# Patient Record
Sex: FEMALE | Race: WHITE
[De-identification: names, ages, dates, MRNs, and addresses within clinical notes are randomized per-mention and may not be internally consistent; named-entity substitution may affect disease eponyms.]

---

## 2018-08-17 ENCOUNTER — HOSPITAL ENCOUNTER (EMERGENCY)
Dept: HOSPITAL 45 - C.EDB | Age: 28
Discharge: HOME | End: 2018-08-17
Payer: COMMERCIAL

## 2018-08-17 VITALS
HEIGHT: 69.02 IN | BODY MASS INDEX: 22.01 KG/M2 | BODY MASS INDEX: 22.01 KG/M2 | WEIGHT: 148.59 LBS | WEIGHT: 148.59 LBS | HEIGHT: 69.02 IN

## 2018-08-17 VITALS — DIASTOLIC BLOOD PRESSURE: 65 MMHG | SYSTOLIC BLOOD PRESSURE: 120 MMHG | HEART RATE: 81 BPM | OXYGEN SATURATION: 99 %

## 2018-08-17 VITALS — TEMPERATURE: 97.7 F

## 2018-08-17 DIAGNOSIS — Z79.899: ICD-10-CM
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
## 2018-08-17 DIAGNOSIS — Z91.048: ICD-10-CM

## 2018-08-17 DIAGNOSIS — T78.40XA: Primary | ICD-10-CM

## 2018-08-17 LAB
ALBUMIN SERPL-MCNC: 3.9 GM/DL (ref 3.4–5)
ALP SERPL-CCNC: 56 U/L (ref 45–117)
ALT SERPL-CCNC: 21 U/L (ref 12–78)
AST SERPL-CCNC: 19 U/L (ref 15–37)
BASOPHILS # BLD: 0.01 K/UL (ref 0–0.2)
BASOPHILS NFR BLD: 0.2 %
BUN SERPL-MCNC: 12 MG/DL (ref 7–18)
CALCIUM SERPL-MCNC: 8.8 MG/DL (ref 8.5–10.1)
CO2 SERPL-SCNC: 23 MMOL/L (ref 21–32)
CREAT SERPL-MCNC: 0.82 MG/DL (ref 0.6–1.2)
EOS ABS #: 0.04 K/UL (ref 0–0.5)
EOSINOPHIL NFR BLD AUTO: 211 K/UL (ref 130–400)
GLUCOSE SERPL-MCNC: 117 MG/DL (ref 70–99)
HCT VFR BLD CALC: 38.8 % (ref 37–47)
HGB BLD-MCNC: 13 G/DL (ref 12–16)
IG#: 0 K/UL (ref 0–0.02)
IMM GRANULOCYTES NFR BLD AUTO: 38.4 %
LIPASE: 190 U/L (ref 73–393)
LYMPHOCYTES # BLD: 1.68 K/UL (ref 1.2–3.4)
MCH RBC QN AUTO: 30.4 PG (ref 25–34)
MCHC RBC AUTO-ENTMCNC: 33.5 G/DL (ref 32–36)
MCV RBC AUTO: 90.7 FL (ref 80–100)
MONO ABS #: 0.31 K/UL (ref 0.11–0.59)
MONOCYTES NFR BLD: 7.1 %
NEUT ABS #: 2.34 K/UL (ref 1.4–6.5)
NEUTROPHILS # BLD AUTO: 0.9 %
NEUTROPHILS NFR BLD AUTO: 53.4 %
PMV BLD AUTO: 11.1 FL (ref 7.4–10.4)
POTASSIUM SERPL-SCNC: 3.6 MMOL/L (ref 3.5–5.1)
PROT SERPL-MCNC: 7.6 GM/DL (ref 6.4–8.2)
RED CELL DISTRIBUTION WIDTH CV: 12.8 % (ref 11.5–14.5)
RED CELL DISTRIBUTION WIDTH SD: 42.2 FL (ref 36.4–46.3)
SODIUM SERPL-SCNC: 138 MMOL/L (ref 136–145)
WBC # BLD AUTO: 4.38 K/UL (ref 4.8–10.8)

## 2018-08-17 NOTE — EMERGENCY ROOM VISIT NOTE
History


Report prepared by Madhuri:  Yana Mares


Under the Supervision of:  Dr. Blake Salcedo M.D.


First contact with patient:  13:31


Chief Complaint:  ALLERGIC REACTION


Stated Complaint:  ALLERGIC REACTION





History of Present Illness


The patient is a 28 year old female who presents to the Emergency Room with 

complaints of an allergic reaction beginning around 30 minutes pta. She reports 

she has exercise induced anaphylaxis and states she was walking very quickly 

around campus when her episode began. She has a rash and abdominal pain but 

denies any nausea. The patient did not take her EpiPen and her last episode was 

2 months ago which resolved after she took her EpiPen. She follows with her 

CHAD Ayala.





   Source of History:  patient


   Onset:  around 30 minutes pta


   Position:  head, abdomen, other (upper and lower extremities)


   Quality:  other (allergic reaction)


   Timing:  other (after walking very quickly around campus)


   Associated Symptoms:  + abdominal pain, + rash, No nausea





Review of Systems


See HPI for pertinent positives and negatives.  A total of ten systems were 

reviewed and were otherwise negative.





Past Medical & Surgical


Medical Problems:


(1) Exercise induced anaphylaxis








Social History


Smoking Status:  Never Smoker


Marital Status:  


Housing Status:  lives with significant other





Current/Historical Medications


Scheduled


Cetirizine (Zyrtec), 10 MG PO DAILY





Allergies


Coded Allergies:  


     Dust (Verified  Allergy, Unknown, congestion, 8/17/18)


Uncoded Allergies:  


     DOGS (Allergy, Unknown, congestion, 3/14/14)


     TREE POLLEN (Allergy, Unknown, congestion, 3/14/14)





Physical Exam


Vital Signs











  Date Time  Temp Pulse Resp B/P (MAP) Pulse Ox O2 Delivery O2 Flow Rate FiO2


 


8/17/18 15:00  90 18 104/66 100 Room Air  


 


8/17/18 14:03  96      


 


8/17/18 13:50     100 Room Air  


 


8/17/18 13:30 36.5 80 20 120/74 100 Room Air  











Physical Exam


GENERAL: Awake, alert, well-appearing, in minimal distress


HENT: Normocephalic, atraumatic. Oropharynx without swelling or significant 

edema. 


EYES: Normal conjunctiva. Sclera non-icteric.


NECK: Supple. No nuchal rigidity. 


RESPIRATORY: Clear to auscultation. No wheezes. Normal respiratory effort.


CARDIAC: Normal rate.  Normal rhythm. Extremities warm and well perfused.


GI: Soft, non-distended. Very mild diffuse tenderness. No rebound or guarding. 


RECTAL: Deferred.


MUSCULOSKELETAL: Atraumatic. Chest examination reveals no tenderness.


LOWER EXTREMITIES: Calves are equal size bilaterally and non-tender. No edema


NEURO: Normal sensorium. No sensory or motor deficits noted. No facial droop.


SKIN: Mild diffuse erythema without appreciable urticaria.





Medical Decision & Procedures


Laboratory Results


8/17/18 13:52








Red Blood Count 4.28, Mean Corpuscular Volume 90.7, Mean Corpuscular Hemoglobin 

30.4, Mean Corpuscular Hemoglobin Concent 33.5, Mean Platelet Volume 11.1, 

Neutrophils (%) (Auto) 53.4, Lymphocytes (%) (Auto) 38.4, Monocytes (%) (Auto) 

7.1, Eosinophils (%) (Auto) 0.9, Basophils (%) (Auto) 0.2, Neutrophils # (Auto) 

2.34, Lymphocytes # (Auto) 1.68, Monocytes # (Auto) 0.31, Eosinophils # (Auto) 

0.04, Basophils # (Auto) 0.01





8/17/18 13:52

















Test


  8/17/18


13:52


 


White Blood Count


  4.38 K/uL


(4.8-10.8)


 


Red Blood Count


  4.28 M/uL


(4.2-5.4)


 


Hemoglobin


  13.0 g/dL


(12.0-16.0)


 


Hematocrit 38.8 % (37-47) 


 


Mean Corpuscular Volume


  90.7 fL


()


 


Mean Corpuscular Hemoglobin


  30.4 pg


(25-34)


 


Mean Corpuscular Hemoglobin


Concent 33.5 g/dl


(32-36)


 


Platelet Count


  211 K/uL


(130-400)


 


Mean Platelet Volume


  11.1 fL


(7.4-10.4)


 


Neutrophils (%) (Auto) 53.4 % 


 


Lymphocytes (%) (Auto) 38.4 % 


 


Monocytes (%) (Auto) 7.1 % 


 


Eosinophils (%) (Auto) 0.9 % 


 


Basophils (%) (Auto) 0.2 % 


 


Neutrophils # (Auto)


  2.34 K/uL


(1.4-6.5)


 


Lymphocytes # (Auto)


  1.68 K/uL


(1.2-3.4)


 


Monocytes # (Auto)


  0.31 K/uL


(0.11-0.59)


 


Eosinophils # (Auto)


  0.04 K/uL


(0-0.5)


 


Basophils # (Auto)


  0.01 K/uL


(0-0.2)


 


RDW Standard Deviation


  42.2 fL


(36.4-46.3)


 


RDW Coefficient of Variation


  12.8 %


(11.5-14.5)


 


Immature Granulocyte % (Auto) 0.0 % 


 


Immature Granulocyte # (Auto)


  0.00 K/uL


(0.00-0.02)


 


Anion Gap


  10.0 mmol/L


(3-11)


 


Est Creatinine Clear Calc


Drug Dose 104.9 ml/min 


 


 


Estimated GFR (


American) 111.3 


 


 


Estimated GFR (Non-


American 96.0 


 


 


BUN/Creatinine Ratio 14.4 (10-20) 


 


Calcium Level


  8.8 mg/dl


(8.5-10.1)


 


Total Bilirubin


  0.7 mg/dl


(0.2-1)


 


Direct Bilirubin


  0.2 mg/dl


(0-0.2)


 


Aspartate Amino Transf


(AST/SGOT) 19 U/L (15-37) 


 


 


Alanine Aminotransferase


(ALT/SGPT) 21 U/L (12-78) 


 


 


Alkaline Phosphatase


  56 U/L


()


 


Total Protein


  7.6 gm/dl


(6.4-8.2)


 


Albumin


  3.9 gm/dl


(3.4-5.0)


 


Lipase


  190 U/L


()


 


Human Chorionic Gonadotropin,


Qual NEG (NEG) 


 











Medications Administered











 Medications


  (Trade)  Dose


 Ordered  Sig/Chayito


 Route  Start Time


 Stop Time Status Last Admin


Dose Admin


 


 Diphenhydramine


 HCl


  (Benadryl Inj)  50 mg  NOW  STAT


 IV  8/17/18 13:37


 8/17/18 13:40 DC 8/17/18 13:56


50 MG


 


 Ondansetron HCl


  (Zofran Inj)  4 mg  NOW  STAT


 IV  8/17/18 13:37


 8/17/18 13:40 DC 8/17/18 13:56


4 MG


 


 Sodium Chloride  1,000 ml @ 


 999 mls/hr  Q1H1M ONCE


 IV  8/17/18 13:37


 8/17/18 14:37 DC 8/17/18 13:56


999 MLS/HR


 


 Dexamethasone


 Sodium Phosphate


  (Decadron Inj)  10 mg  STK-MED ONCE


 .ROUTE  8/17/18 13:46


 8/17/18 13:47 DC 8/17/18 13:56


10 MG











ED Course


1335: The patient was evaluated in room C11. A complete history and physical 

exam was performed.





1337: Ordered Sodium Chloride 1000 ml @ 999 mls/hr IV, Dexamethasone Sodium 

Phosphate 10 mg/Syringe 2.5 ml @ 1 mls/min IV, Zofran Inj 4 mg IV, Benadryl Inj 

50 mg IV





1346: Ordered Decadron Inj 10 mg .route 





1520: I reevaluated the patient. Discussed results and discharge instructions: 

She verbalized understanding and agreement. The patient is ready for discharge.





Medical Decision


Triage Nursing notes reviewed.





Differential diagnosis:


Etiologies such as allergic reaction, anaphylaxis, urticaria, Sanchez-Dung 

syndrome, toxic epidermal necrolysis, erythema multiforme, cellulitis, as well 

as others were entertained.





Patient presents complaining of the approximate last hour of experiencing 

allergic reaction symptoms.  States she gets anaphylactic secondary to 

exercise.  Allergist thinks this may be related to possible inhaling allergens 

such as pollen.  Did not take her EpiPen or any medicine prior to arrival.  

States that she has some redness and rash minimal some abdominal upset and some 

mild shortness of breath.  States that her throat is not closing or swelling 

right now.  States she does not feel like she needs her EpiPen right now.  No 

significant you to carry to the rash more of a diffuse erythema.  Given steroid

, Benadryl, Pepcid, and IV fluid.  Minimally tender abdomen and basic 

laboratory studies were completed.  Doubt appendicitis or other acute surgical 

intra-abdominal emergency.  HCG was sent.  Patient reevaluated and reported 

improvement.  Labs are unremarkable.  Feeling improved here after medication 

and no longer having any abdominal pain.  Please likely reaction to her 

systemic allergic reaction process.  Doubt infection or appendicitis.  Given 

her improvement feels she stable for discharge home.  Has an EpiPen at home 

already.  Recommend Pepcid and Benadryl additionally over the next 2-3 days.  

She acknowledges.  Discussed return criteria.





Medication Reconcilliation


Current Medication List:  was personally reviewed by me





Blood Pressure Screening


Patient's blood pressure:  Normal blood pressure


Blood pressure disposition:  Did not require urgent referral





Impression





 Primary Impression:  


 Allergic reaction





Scribe Attestation


The scribe's documentation has been prepared under my direction and personally 

reviewed by me in its entirety. I confirm that the note above accurately 

reflects all work, treatment, procedures, and medical decision making performed 

by me.





Departure Information


Dispostion


Home / Self-Care





Referrals


Graciela Martínez (PCP)





Forms


HOME CARE DOCUMENTATION FORM,                                                 

               IMPORTANT VISIT INFORMATION





Patient Instructions


My [facility]





Additional Instructions





Please utilize additional Benadryl and Pepcid over the next 2-3 days to help 

with any symptoms.  If you experience a severe symptoms please feel free to 

utilize her EpiPen at any time.  If you have new concerns or any issues you can 

always return here for reevaluation.  These avoid any significant exercise or 

allergen exposure for the next several days.  Recommend follow-up with your 

regular doctor in the next week.





Problem Qualifiers








 Primary Impression:  


 Allergic reaction


 Encounter type:  initial encounter  Qualified Codes:  T78.40XA - Allergy, 

unspecified, initial encounter